# Patient Record
Sex: MALE | Race: BLACK OR AFRICAN AMERICAN | NOT HISPANIC OR LATINO | URBAN - METROPOLITAN AREA
[De-identification: names, ages, dates, MRNs, and addresses within clinical notes are randomized per-mention and may not be internally consistent; named-entity substitution may affect disease eponyms.]

---

## 2021-12-30 ENCOUNTER — OFFICE (OUTPATIENT)
Dept: URBAN - METROPOLITAN AREA CLINIC 102 | Facility: CLINIC | Age: 21
End: 2021-12-30
Payer: MEDICAID

## 2021-12-30 VITALS
TEMPERATURE: 97.8 F | HEIGHT: 69 IN | SYSTOLIC BLOOD PRESSURE: 145 MMHG | HEART RATE: 95 BPM | WEIGHT: 315 LBS | DIASTOLIC BLOOD PRESSURE: 115 MMHG

## 2021-12-30 DIAGNOSIS — R10.84 GENERALIZED ABDOMINAL PAIN: ICD-10-CM

## 2021-12-30 DIAGNOSIS — R79.89 OTHER SPECIFIED ABNORMAL FINDINGS OF BLOOD CHEMISTRY: ICD-10-CM

## 2021-12-30 PROCEDURE — 99203 OFFICE O/P NEW LOW 30 MIN: CPT

## 2021-12-30 NOTE — SERVICEHPINOTES
SUSHIL WEINER   is a   21   year old male who is being seen in consultation at the request of   MARIANO RIVERA   for abdominal pain. Patient reports a single episode generalized abdominal pain a few weeks ago . He became concerned when pain worsened localizing on the lower abdomen he thought it could be his appendix. Patient was evaluated in the ER had a negative CT abd/pelv and bw with slightly elevated white count, otherwise no acute abnormality. Sx resolved. There was no fever, nausea, vomiting, diarrhea. ana perez  He followed up with PCP a few days later. PCP checked fecal calprotectin and celiac serology, both wnl. He states his sx have not recurred. His bowel habits are regular, with soft BMs every day. Denies hematochezia, melena.
ana perez Patient denies other concerns during this visit.

## 2022-06-21 ENCOUNTER — OFFICE (OUTPATIENT)
Dept: URBAN - METROPOLITAN AREA CLINIC 102 | Facility: CLINIC | Age: 22
End: 2022-06-21
Payer: MEDICAID

## 2022-06-21 VITALS
WEIGHT: 315 LBS | SYSTOLIC BLOOD PRESSURE: 140 MMHG | HEIGHT: 69 IN | TEMPERATURE: 97.2 F | HEART RATE: 79 BPM | DIASTOLIC BLOOD PRESSURE: 80 MMHG

## 2022-06-21 DIAGNOSIS — K52.9 NONINFECTIVE GASTROENTERITIS AND COLITIS, UNSPECIFIED: ICD-10-CM

## 2022-06-21 DIAGNOSIS — R10.84 GENERALIZED ABDOMINAL PAIN: ICD-10-CM

## 2022-06-21 DIAGNOSIS — E66.01 MORBID (SEVERE) OBESITY DUE TO EXCESS CALORIES: ICD-10-CM

## 2022-06-21 DIAGNOSIS — R15.2 FECAL URGENCY: ICD-10-CM

## 2022-06-21 PROCEDURE — 99214 OFFICE O/P EST MOD 30 MIN: CPT

## 2022-06-21 NOTE — SERVICEHPINOTES
21 year old male following up after 2 ER visits for abdominal pain. Reports  diffuse abdominal pain with assoc. constipation of acute onset early May 2022. CT on 5/16 showed mild diverticulosis at splenic flexure w/o signs of diverticulitis, empty and collapsed descending colon through rectum with mild wall thickening. no pericolonic inflammatory stranding. IMPRESSION: mild-left sided colitis vs empty nondistended bowel. He had mild leukocytosis WBC 11.9, rest of labs were unremarkable. Patient was discharged w Bentyl, Zofran and Augmentin. He returned to ER about 5 days later as he was not improving. ABD XR on 5/21 showed non-specific but non-obstructing bowel gas pattern.
br
br He is currently feeling better although he has noticed more fecal urgency "since having colitis". He feels like there is more gas. Moving bowels daily, type 4 BSS. No blood in stool or black stools. His PCP advised starting Pepcid as he reported some upper abd burning, he explains it was only on the sides. However he is not taking med daily as that pain is not frequent. Denies heartburn, acid regurgitation, epigastric pain. No fevers, chills, night sweats.  CLINICAL HX:
br Visit 12/30/21: Patient reports a single episode generalized abdominal pain a few weeks ago . He became concerned when pain worsened localizing on the lower abdomen he thought it could be his appendix. Patient was evaluated in the ER had a negative CT abd/pelv and bw with slightly elevated white count, otherwise no acute abnormality. Sx resolved. There was no fever, nausea, vomiting, diarrhea. He followed up with PCP a few days later. PCP checked fecal calprotectin and celiac serology, both wnl. He states his sx have not recurred. His bowel habits are regular, with soft BMs every day.

## 2022-07-14 ENCOUNTER — ON CAMPUS - OUTPATIENT (OUTPATIENT)
Dept: URBAN - METROPOLITAN AREA HOSPITAL 65 | Facility: HOSPITAL | Age: 22
End: 2022-07-14
Payer: MEDICAID

## 2022-07-14 DIAGNOSIS — K52.9 NONINFECTIVE GASTROENTERITIS AND COLITIS, UNSPECIFIED: ICD-10-CM

## 2022-07-14 DIAGNOSIS — R10.84 GENERALIZED ABDOMINAL PAIN: ICD-10-CM

## 2022-07-14 PROCEDURE — 45380 COLONOSCOPY AND BIOPSY: CPT | Performed by: INTERNAL MEDICINE

## 2022-09-07 ENCOUNTER — OFFICE (OUTPATIENT)
Dept: URBAN - METROPOLITAN AREA CLINIC 102 | Facility: CLINIC | Age: 22
End: 2022-09-07
Payer: MEDICAID

## 2022-09-07 VITALS
SYSTOLIC BLOOD PRESSURE: 153 MMHG | DIASTOLIC BLOOD PRESSURE: 95 MMHG | HEIGHT: 69 IN | HEART RATE: 96 BPM | WEIGHT: 315 LBS | TEMPERATURE: 97.7 F

## 2022-09-07 DIAGNOSIS — K64.9 UNSPECIFIED HEMORRHOIDS: ICD-10-CM

## 2022-09-07 DIAGNOSIS — R10.84 GENERALIZED ABDOMINAL PAIN: ICD-10-CM

## 2022-09-07 DIAGNOSIS — E66.01 MORBID (SEVERE) OBESITY DUE TO EXCESS CALORIES: ICD-10-CM

## 2022-09-07 PROCEDURE — 99213 OFFICE O/P EST LOW 20 MIN: CPT

## 2022-09-07 NOTE — SERVICEHPINOTES
22 year old male following up after colonoscopy.
br 
br 12/2021: Initial eval for abd pain. Lower abd discomfort w/o assoc sx. Patient visited ER 12/2021 CT abd/pelv negative and bw with slightly elevated white count, otherwise no acute abnormality. PCP checked fecal calprotectin, celiac both unremarkable.br
br6/2022: Returned after 2 additional ER visits. This time abd pain more diffuse and w assoc constipation. CT on 5/16 showed mild diverticulosis at splenic flexure w/o signs of diverticulitis, empty and collapsed descending colon through rectum with mild wall thickening. no pericolonic inflammatory stranding. IMPRESSION: mild-left sided colitis vs empty nondistended bowel. He had mild leukocytosis WBC 11.9, rest of labs were unremarkable. Patient was discharged w Bentyl, Zofran and Augmentin. He returned to ER about 5 days later as he was not improving. ABD XR on 5/21 showed non-specific but non-obstructing bowel gas pattern. He reported fecal urgency, gas and extreme concern about "colitis" therefore proceeded with colonoscopy.
br
br --7/14/22 Colon: small internal and external hemorrhoids. entire colon and terminal ileum appeared normal. Random bx neg.br
br Today he reports sx have resolved completely after procedure. Moving bowels daily, type 4 BSS. No hematochezia or melena.  Denies heartburn, acid regurgitation, abdominal pain, hematochezia, melena. No fevers, chills, night sweats.

## 2025-03-04 ENCOUNTER — OFFICE (OUTPATIENT)
Dept: URBAN - METROPOLITAN AREA CLINIC 102 | Facility: CLINIC | Age: 25
End: 2025-03-04
Payer: MEDICAID

## 2025-03-04 VITALS
SYSTOLIC BLOOD PRESSURE: 152 MMHG | TEMPERATURE: 98.4 F | DIASTOLIC BLOOD PRESSURE: 94 MMHG | HEART RATE: 90 BPM | HEIGHT: 69 IN | WEIGHT: 315 LBS | SYSTOLIC BLOOD PRESSURE: 142 MMHG | DIASTOLIC BLOOD PRESSURE: 89 MMHG

## 2025-03-04 DIAGNOSIS — R63.0 ANOREXIA: ICD-10-CM

## 2025-03-04 DIAGNOSIS — K76.0 FATTY (CHANGE OF) LIVER, NOT ELSEWHERE CLASSIFIED: ICD-10-CM

## 2025-03-04 DIAGNOSIS — R14.0 ABDOMINAL DISTENSION (GASEOUS): ICD-10-CM

## 2025-03-04 DIAGNOSIS — R10.9 UNSPECIFIED ABDOMINAL PAIN: ICD-10-CM

## 2025-03-04 DIAGNOSIS — K59.00 CONSTIPATION, UNSPECIFIED: ICD-10-CM

## 2025-03-04 PROCEDURE — 99215 OFFICE O/P EST HI 40 MIN: CPT

## 2025-03-04 NOTE — SERVICEHPINOTES
SUSHIL WEINER   is a   24   male who presents for f/u after ER visits. br
br 
The patient states two weeks ago he ate ice-cream and felt he got food poisoning "bloating, abdominal pain radiating to the right back, loss of appetite, "went to ER twice with these sx and recent CT of abdomen noted: fatty liver and overall unremarkable result. 
br
br He states in early 2/2025 h was treated for pneumonia and his chest x-ray noted: subtle increased opacification in the right infrahilar region, left lung clear. br
br
brThe patient denies abdominal or epigastric pain, dysphagia, acid reflux, nausea, vomiting, early satiety, postprandial fullness, melena, loss of appetite, or unintentional weight loss. The patient denies blood in stool (mixed or on wiping), mucus in stool, constipation, diarrhea, fever, chills, night sweats, recent antibiotic use, or recent travel. Bowel movements are altered bowel habits by not moving his bowel daily, on BSS type 4 without straining. The patient submitted stool sample for "some studies to his PCP office". The patient has no personal history of cardiovascular disease and is not on anticoagulants or NSAIDs. There is no family history of colorectal cancer.

## 2025-03-26 ENCOUNTER — OFFICE (OUTPATIENT)
Dept: URBAN - METROPOLITAN AREA CLINIC 102 | Facility: CLINIC | Age: 25
End: 2025-03-26
Payer: MEDICAID

## 2025-03-26 DIAGNOSIS — K76.0 FATTY (CHANGE OF) LIVER, NOT ELSEWHERE CLASSIFIED: ICD-10-CM

## 2025-03-26 PROCEDURE — 76981 USE PARENCHYMA: CPT | Performed by: PHYSICIAN ASSISTANT

## 2025-04-04 ENCOUNTER — ON CAMPUS - OUTPATIENT (OUTPATIENT)
Dept: URBAN - METROPOLITAN AREA HOSPITAL 65 | Facility: HOSPITAL | Age: 25
End: 2025-04-04
Payer: MEDICAID

## 2025-04-04 DIAGNOSIS — K29.50 UNSPECIFIED CHRONIC GASTRITIS WITHOUT BLEEDING: ICD-10-CM

## 2025-04-04 DIAGNOSIS — K20.80 OTHER ESOPHAGITIS WITHOUT BLEEDING: ICD-10-CM

## 2025-04-04 DIAGNOSIS — R10.11 RIGHT UPPER QUADRANT PAIN: ICD-10-CM

## 2025-04-04 DIAGNOSIS — R13.19 OTHER DYSPHAGIA: ICD-10-CM

## 2025-04-04 PROCEDURE — 43239 EGD BIOPSY SINGLE/MULTIPLE: CPT | Performed by: INTERNAL MEDICINE

## 2025-04-30 ENCOUNTER — OFFICE (OUTPATIENT)
Dept: URBAN - METROPOLITAN AREA CLINIC 102 | Facility: CLINIC | Age: 25
End: 2025-04-30
Payer: MEDICAID

## 2025-04-30 VITALS
SYSTOLIC BLOOD PRESSURE: 143 MMHG | HEIGHT: 69 IN | HEART RATE: 83 BPM | TEMPERATURE: 98.5 F | WEIGHT: 315 LBS | DIASTOLIC BLOOD PRESSURE: 86 MMHG

## 2025-04-30 DIAGNOSIS — R10.9 UNSPECIFIED ABDOMINAL PAIN: ICD-10-CM

## 2025-04-30 DIAGNOSIS — R63.0 ANOREXIA: ICD-10-CM

## 2025-04-30 DIAGNOSIS — K76.0 FATTY (CHANGE OF) LIVER, NOT ELSEWHERE CLASSIFIED: ICD-10-CM

## 2025-04-30 DIAGNOSIS — E66.9 OBESITY, UNSPECIFIED: ICD-10-CM

## 2025-04-30 PROCEDURE — 99214 OFFICE O/P EST MOD 30 MIN: CPT | Performed by: PHYSICIAN ASSISTANT
